# Patient Record
Sex: FEMALE | Race: WHITE | NOT HISPANIC OR LATINO | URBAN - METROPOLITAN AREA
[De-identification: names, ages, dates, MRNs, and addresses within clinical notes are randomized per-mention and may not be internally consistent; named-entity substitution may affect disease eponyms.]

---

## 2021-01-21 ENCOUNTER — LAB OUTSIDE AN ENCOUNTER (OUTPATIENT)
Dept: URBAN - METROPOLITAN AREA TELEHEALTH 2 | Facility: TELEHEALTH | Age: 46
End: 2021-01-21

## 2021-01-21 ENCOUNTER — OFFICE VISIT (OUTPATIENT)
Dept: URBAN - METROPOLITAN AREA TELEHEALTH 2 | Facility: TELEHEALTH | Age: 46
End: 2021-01-21
Payer: COMMERCIAL

## 2021-01-21 DIAGNOSIS — Z12.11 COLON CANCER SCREENING: ICD-10-CM

## 2021-01-21 DIAGNOSIS — K75.81 NASH (NONALCOHOLIC STEATOHEPATITIS): ICD-10-CM

## 2021-01-21 DIAGNOSIS — K21.9 GERD: ICD-10-CM

## 2021-01-21 PROCEDURE — G8483 FLU IMM NO ADMIN DOC REA: HCPCS | Performed by: INTERNAL MEDICINE

## 2021-01-21 PROCEDURE — 99244 OFF/OP CNSLTJ NEW/EST MOD 40: CPT | Performed by: INTERNAL MEDICINE

## 2021-01-21 RX ORDER — FAMOTIDINE 20 MG/1
TAKE 1 TABLET BY MOUTH TWICE A DAY TABLET ORAL TWICE A DAY
Status: ACTIVE | COMMUNITY

## 2021-01-21 RX ORDER — PANTOPRAZOLE SODIUM 40 MG/1
TAKE 1 TABLET (40 MG) BY ORAL ROUTE ONCE DAILY TABLET, DELAYED RELEASE ORAL ONCE A DAY
Status: ACTIVE | COMMUNITY

## 2021-01-21 NOTE — HPI-OTHER HISTORIES
Maria Del Rosario is a 46 YO referred by Dr. Moses for consultation of reflux and fatty liver. She has been following with Dr. Moses for over a year for reflux and fatigue. She has had multiple tests done for fatigue and she was found to have elevated ALT/AST. She had an US with fatty liver and a fibroscan with likely F3 fibrosis. She was having severe fatigue, itching and weight gain despite changing her diet and mountain biking. She is on omeprazole 40mg daily and famotidine daily. She was referred to Dr. Allen office. She met with Heidi and she had the work up with her including her chronic serologies and ordered an EGD for reflux. She did not have this due to lack of confidence in the practice. She has since been working with a nutritionist locally at [facility]. Today she is frustrated with her weight and her symptoms. MB laceration to L forearm, 3 (4-0) absorbable sutures, plus dermabond applied, will d/c home, PMD f/u prn

## 2021-01-22 ENCOUNTER — WEB ENCOUNTER (OUTPATIENT)
Dept: URBAN - NONMETROPOLITAN AREA CLINIC 2 | Facility: CLINIC | Age: 46
End: 2021-01-22

## 2021-02-10 ENCOUNTER — OFFICE VISIT (OUTPATIENT)
Dept: URBAN - NONMETROPOLITAN AREA SURGERY CENTER 1 | Facility: SURGERY CENTER | Age: 46
End: 2021-02-10
Payer: COMMERCIAL

## 2021-02-10 DIAGNOSIS — R10.13 ABDOMINAL DISCOMFORT, EPIGASTRIC: ICD-10-CM

## 2021-02-10 DIAGNOSIS — K29.30 CHRONIC SUPERFICIAL GASTRITIS: ICD-10-CM

## 2021-02-10 PROCEDURE — 43239 EGD BIOPSY SINGLE/MULTIPLE: CPT | Performed by: INTERNAL MEDICINE

## 2021-02-10 PROCEDURE — G8907 PT DOC NO EVENTS ON DISCHARG: HCPCS | Performed by: INTERNAL MEDICINE

## 2021-04-14 ENCOUNTER — OFFICE VISIT (OUTPATIENT)
Dept: URBAN - METROPOLITAN AREA TELEHEALTH 2 | Facility: TELEHEALTH | Age: 46
End: 2021-04-14
Payer: COMMERCIAL

## 2021-04-14 DIAGNOSIS — Z12.11 COLON CANCER SCREENING: ICD-10-CM

## 2021-04-14 DIAGNOSIS — K21.9 GERD: ICD-10-CM

## 2021-04-14 DIAGNOSIS — K75.81 NASH (NONALCOHOLIC STEATOHEPATITIS): ICD-10-CM

## 2021-04-14 PROCEDURE — 99213 OFFICE O/P EST LOW 20 MIN: CPT | Performed by: INTERNAL MEDICINE

## 2021-04-14 RX ORDER — PANTOPRAZOLE SODIUM 40 MG/1
TAKE 1 TABLET (40 MG) BY ORAL ROUTE ONCE DAILY TABLET, DELAYED RELEASE ORAL ONCE A DAY
Status: ACTIVE | COMMUNITY

## 2021-04-14 RX ORDER — FAMOTIDINE 20 MG/1
TAKE 1 TABLET BY MOUTH TWICE A DAY TABLET ORAL TWICE A DAY
Status: ACTIVE | COMMUNITY

## 2021-04-14 NOTE — HPI-TODAY'S VISIT:
Maria Del Rosario is a 44 YO referred by Dr. Moses for consultation of reflux and fatty liver. She has been following with Dr. Moses for over a year for reflux and fatigue. She has had multiple tests done for fatigue and she was found to have elevated ALT/AST. She had an US with fatty liver and a fibroscan with likely F3 fibrosis. She was having severe fatigue, itching and weight gain despite changing her diet and mountain biking. She is on omeprazole 40mg daily and famotidine daily. She was referred to Dr. Allen office. She met with Heidi and she had the work up with her including her chronic serologies and ordered an EGD for reflux. She did not have this due to lack of confidence in the practice. She has since been working with a nutritionist locally at [facility]. Today she is frustrated with her weight and her symptoms. MB  4/14/2021  Maria Del Rosario presents for follow up of reflux, gastritis, and LOPEZ. She is doing well today. She is s/p EGD with mild reflux and gastritis. Her reflux feels well controleld on the pantoprazole 40mg daily. She did meet with the [facility] team and is s/p biopsy now being worked up for LOPEZ trial. Today she is doing well otherwise. MB

## 2021-04-15 ENCOUNTER — OFFICE VISIT (OUTPATIENT)
Dept: URBAN - METROPOLITAN AREA TELEHEALTH 2 | Facility: TELEHEALTH | Age: 46
End: 2021-04-15

## 2021-10-14 ENCOUNTER — OFFICE VISIT (OUTPATIENT)
Dept: URBAN - NONMETROPOLITAN AREA CLINIC 2 | Facility: CLINIC | Age: 46
End: 2021-10-14
Payer: COMMERCIAL

## 2021-10-14 ENCOUNTER — WEB ENCOUNTER (OUTPATIENT)
Dept: URBAN - NONMETROPOLITAN AREA CLINIC 2 | Facility: CLINIC | Age: 46
End: 2021-10-14

## 2021-10-14 VITALS
TEMPERATURE: 97.5 F | HEART RATE: 87 BPM | HEIGHT: 63 IN | WEIGHT: 197.9 LBS | SYSTOLIC BLOOD PRESSURE: 127 MMHG | BODY MASS INDEX: 35.07 KG/M2 | DIASTOLIC BLOOD PRESSURE: 84 MMHG

## 2021-10-14 DIAGNOSIS — K75.81 NASH (NONALCOHOLIC STEATOHEPATITIS): ICD-10-CM

## 2021-10-14 DIAGNOSIS — Z12.11 COLON CANCER SCREENING: ICD-10-CM

## 2021-10-14 DIAGNOSIS — K21.9 GERD: ICD-10-CM

## 2021-10-14 PROCEDURE — 99214 OFFICE O/P EST MOD 30 MIN: CPT | Performed by: NURSE PRACTITIONER

## 2021-10-14 RX ORDER — PANTOPRAZOLE SODIUM 40 MG/1
TAKE 1 TABLET (40 MG) BY ORAL ROUTE ONCE DAILY TABLET, DELAYED RELEASE ORAL ONCE A DAY
Status: ACTIVE | COMMUNITY

## 2021-10-14 RX ORDER — FAMOTIDINE 20 MG/1
TAKE 1 TABLET BY MOUTH TWICE A DAY TABLET ORAL TWICE A DAY
Status: ACTIVE | COMMUNITY

## 2021-10-14 NOTE — HPI-TODAY'S VISIT:
Maria Del Rosario is a 44 YO referred by Dr. Moses for consultation of reflux and fatty liver. She has been following with Dr. Moses for over a year for reflux and fatigue. She has had multiple tests done for fatigue and she was found to have elevated ALT/AST. She had an US with fatty liver and a fibroscan with likely F3 fibrosis. She was having severe fatigue, itching and weight gain despite changing her diet and mountain biking. She is on omeprazole 40mg daily and famotidine daily. She was referred to Dr. Allen office. She met with Heidi and she had the work up with her including her chronic serologies and ordered an EGD for reflux. She did not have this due to lack of confidence in the practice. She has since been working with a nutritionist locally at [facility]. Today she is frustrated with her weight and her symptoms. MB  4/14/2021  Maria Del Rosario presents for follow up of reflux, gastritis, and LOPEZ. She is doing well today. She is s/p EGD with mild reflux and gastritis. Her reflux feels well controleld on the pantoprazole 40mg daily. She did meet with the [place] liver team and is s/p biopsy now being worked up for LOPEZ trial. Today she is doing well otherwise. MB   10/14/2021 Emily presents for follow-up of reflux and Lopez.  Since her last visit she is doing tremendously better.  She is down 40 pounds after working with the health and .  She has cut out all processed carbohydrates and is feeling well.  She is on Protonix 40 mg daily and would like to wean this.  Today she is doing much better.  Her liver enzymes have normalized and she is currently not on trial at [place] for her Lopez.  MB

## 2022-04-14 ENCOUNTER — DASHBOARD ENCOUNTERS (OUTPATIENT)
Age: 47
End: 2022-04-14

## 2022-04-14 ENCOUNTER — LAB OUTSIDE AN ENCOUNTER (OUTPATIENT)
Dept: URBAN - NONMETROPOLITAN AREA CLINIC 2 | Facility: CLINIC | Age: 47
End: 2022-04-14

## 2022-04-14 ENCOUNTER — OFFICE VISIT (OUTPATIENT)
Dept: URBAN - NONMETROPOLITAN AREA CLINIC 2 | Facility: CLINIC | Age: 47
End: 2022-04-14
Payer: COMMERCIAL

## 2022-04-14 VITALS
SYSTOLIC BLOOD PRESSURE: 135 MMHG | HEART RATE: 95 BPM | WEIGHT: 219 LBS | HEIGHT: 63 IN | DIASTOLIC BLOOD PRESSURE: 84 MMHG | BODY MASS INDEX: 38.8 KG/M2 | TEMPERATURE: 97.7 F

## 2022-04-14 DIAGNOSIS — K75.81 NASH (NONALCOHOLIC STEATOHEPATITIS): ICD-10-CM

## 2022-04-14 DIAGNOSIS — K21.9 GERD: ICD-10-CM

## 2022-04-14 DIAGNOSIS — Z12.11 COLON CANCER SCREENING: ICD-10-CM

## 2022-04-14 PROBLEM — 442685003 NASH - NONALCOHOLIC STEATOHEPATITIS: Status: ACTIVE | Noted: 2021-01-21

## 2022-04-14 PROBLEM — 235595009 GASTROESOPHAGEAL REFLUX DISEASE: Status: ACTIVE | Noted: 2021-01-21

## 2022-04-14 PROCEDURE — 99214 OFFICE O/P EST MOD 30 MIN: CPT | Performed by: NURSE PRACTITIONER

## 2022-04-14 RX ORDER — FAMOTIDINE 20 MG/1
TAKE 1 TABLET BY MOUTH TWICE A DAY TABLET ORAL TWICE A DAY
Status: ACTIVE | COMMUNITY

## 2022-04-14 RX ORDER — HYDROXYCHLOROQUINE SULFATE 200 MG/1
AS DIRECTED TABLET, FILM COATED ORAL
Status: ACTIVE | COMMUNITY

## 2022-04-14 RX ORDER — PANTOPRAZOLE SODIUM 40 MG/1
TAKE 1 TABLET (40 MG) BY ORAL ROUTE ONCE DAILY TABLET, DELAYED RELEASE ORAL ONCE A DAY
Status: ACTIVE | COMMUNITY

## 2022-04-14 NOTE — HPI-TODAY'S VISIT:
Maria Del Rosario is a 44 YO referred by Dr. Moses for consultation of reflux and fatty liver. She has been following with Dr. Moses for over a year for reflux and fatigue. She has had multiple tests done for fatigue and she was found to have elevated ALT/AST. She had an US with fatty liver and a fibroscan with likely F3 fibrosis. She was having severe fatigue, itching and weight gain despite changing her diet and mountain biking. She is on omeprazole 40mg daily and famotidine daily. She was referred to Dr. Allen office. She met with Heidi and she had the work up with her including her chronic serologies and ordered an EGD for reflux. She did not have this due to lack of confidence in the practice. She has since been working with a nutritionist locally at [facility]. Today she is frustrated with her weight and her symptoms. MB  4/14/2021  Maria Del Rosario presents for follow up of reflux, gastritis, and LOPEZ. She is doing well today. She is s/p EGD with mild reflux and gastritis. Her reflux feels well controleld on the pantoprazole 40mg daily. She did meet with the [place] liver team and is s/p biopsy now being worked up for LOPEZ trial. Today she is doing well otherwise. MB   10/14/2021 Emily presents for follow-up of reflux and Lopez.  Since her last visit she is doing tremendously better.  She is down 40 pounds after working with the health and .  She has cut out all processed carbohydrates and is feeling well.  She is on Protonix 40 mg daily and would like to wean this.  Today she is doing much better.  Her liver enzymes have normalized and she is currently not on trial at [place] for her Lopez.  MB 4/14/2022 Darlene presents for follow-up of fatty liver.  She has gained over 20 pounds back of her weight, mostly after an acute viral illness this winter.  She subsequently being worked up for rheumatoid arthritis.  She has not been exercising quite as much as she was.  She avoids flour and sugar but does not limit her calories.  She was evaluated by [facility] and did undergo a liver biopsy with minimal fibrosis.  She was not a candidate for the Lopez trial at the time.  Today she is doing well otherwise, she is due for repeat labs on her liver.  She agrees to continue to work on weight loss.  She is due for colorectal cancer screening this October we will schedule her screening visit today for October.  MB

## 2022-04-15 LAB
ALBUMIN: 4.4
ALKALINE PHOSPHATASE: 94
ALT (SGPT): 41
AST (SGOT): 24
BILIRUBIN, DIRECT: 0.11
BILIRUBIN, TOTAL: 0.4
PROTEIN, TOTAL: 6.8

## 2022-07-21 PROBLEM — 275978004 COLON CANCER SCREENING: Status: ACTIVE | Noted: 2021-01-21

## 2022-10-11 ENCOUNTER — WEB ENCOUNTER (OUTPATIENT)
Dept: URBAN - NONMETROPOLITAN AREA CLINIC 2 | Facility: CLINIC | Age: 47
End: 2022-10-11

## 2022-10-24 ENCOUNTER — OFFICE VISIT (OUTPATIENT)
Dept: URBAN - NONMETROPOLITAN AREA SURGERY CENTER 1 | Facility: SURGERY CENTER | Age: 47
End: 2022-10-24

## 2022-10-31 ENCOUNTER — OFFICE VISIT (OUTPATIENT)
Dept: URBAN - NONMETROPOLITAN AREA SURGERY CENTER 1 | Facility: SURGERY CENTER | Age: 47
End: 2022-10-31

## 2022-11-21 ENCOUNTER — ERX REFILL RESPONSE (OUTPATIENT)
Dept: URBAN - NONMETROPOLITAN AREA CLINIC 2 | Facility: CLINIC | Age: 47
End: 2022-11-21

## 2022-11-21 RX ORDER — PANTOPRAZOLE 20 MG/1
TAKE ONE TABLET BY MOUTH TWICE A DAY TABLET, DELAYED RELEASE ORAL
Qty: 180 TABLET | Refills: 0 | OUTPATIENT

## 2022-11-21 RX ORDER — PANTOPRAZOLE 20 MG/1
TAKE ONE TABLET BY MOUTH TWICE A DAY TABLET, DELAYED RELEASE ORAL
Qty: 180 TABLET | Refills: 3 | OUTPATIENT

## 2022-11-29 ENCOUNTER — OFFICE VISIT (OUTPATIENT)
Dept: URBAN - NONMETROPOLITAN AREA CLINIC 2 | Facility: CLINIC | Age: 47
End: 2022-11-29

## 2022-12-01 ENCOUNTER — TELEPHONE ENCOUNTER (OUTPATIENT)
Dept: URBAN - NONMETROPOLITAN AREA CLINIC 2 | Facility: CLINIC | Age: 47
End: 2022-12-01

## 2023-01-05 ENCOUNTER — WEB ENCOUNTER (OUTPATIENT)
Dept: URBAN - NONMETROPOLITAN AREA CLINIC 2 | Facility: CLINIC | Age: 48
End: 2023-01-05